# Patient Record
(demographics unavailable — no encounter records)

---

## 2024-12-18 NOTE — PHYSICAL EXAM
[Clear to Auscultation] : lungs were clear to auscultation bilaterally [Normal S1, S2] : normal S1 and S2 [Normal Appearance] : normal in appearance [No Masses] : no palpable masses [No Nipple Discharge] : no nipple discharge [No Axillary Lymphadenopathy] : no axillary lymphadenopathy [Soft] : abdomen soft [Normal] : affect was normal and insight and judgment were intact

## 2024-12-18 NOTE — HISTORY OF PRESENT ILLNESS
[FreeTextEntry1] : HERE FOR Comprehensive annual examination  [de-identified] : LUNG CANCER STAGE 4 F/U MSK SINCE 2022

## 2024-12-18 NOTE — HEALTH RISK ASSESSMENT
[Fair] :  ~his/her~ mood as fair [No] : No [No falls in past year] : Patient reported no falls in the past year [0] : 2) Feeling down, depressed, or hopeless: Not at all (0) [PHQ-2 Negative - No further assessment needed] : PHQ-2 Negative - No further assessment needed [Never] : Never [Patient reported mammogram was normal] : Patient reported mammogram was normal [Patient reported PAP Smear was normal] : Patient reported PAP Smear was normal [HIV test declined] : HIV test declined [Hepatitis C test declined] : Hepatitis C test declined [Fully functional (bathing, dressing, toileting, transferring, walking, feeding)] : Fully functional (bathing, dressing, toileting, transferring, walking, feeding) [Fully functional (using the telephone, shopping, preparing meals, housekeeping, doing laundry, using] : Fully functional and needs no help or supervision to perform IADLs (using the telephone, shopping, preparing meals, housekeeping, doing laundry, using transportation, managing medications and managing finances) [With Patient/Caregiver] : , with patient/caregiver [[id number]] : 0 [MammogramDate] : 2024 [PapSmearDate] : 2023 [AdvancecareDate] : 12/2024

## 2024-12-18 NOTE — ASSESSMENT
[FreeTextEntry1] : DISCUSSED BLOOD WORK RESULTS FROM 12/11/2024 HB 11.1 ON CHEMO STABLE HDL is low.  I recommend trying lifestyle changes and sticking to a low cholesterol diet and  heart-healthy fat found in olives,Nuts, including Brazil nuts, almonds, pistachios, WALNUTS, FISH salmon mackerel albacore tuna sardines rainbow trout, Avocados,Soy-based products, Tu seeds  returning in 3-4 months for repeat fasting blood work.    your vitamin D level was low.  This is a very common finding. I suggest taking a vitamin D3 supplement of 1000 I.U. daily. This is available over the counter.  MEDICATION RECONCILIATION DONE *Immunizations COVID -19 TOTAL VACCINE   4 Influenza  10/2024 Pneumococcal - N/A TDAP -  UTD shingles vaccine N/A EKG done for HCM at this office today;  NSR no acute ST-T changes Preventive medicine discussed - including importance of lifestyle modification - with incorporation of healthy diet,  recommended Diet low fat diet + regular exercise con't calcium 1200-1500mg and vit D 9640-0348 IU daily and regular weight bearing exercise for OP prevention weight loss counseling provided as above Depression screening WITH the Patient Health Questionnaire-2 (PHQ-2), THE RESULT WAS NEGATIVE. The benefits of adequate calcium intake and routine daily cardiovascular exercise were reviewed with the patient.  The patient was informed regarding the benefits of a YEARLY screening FOR SKIN CANCER -Recommended following up with dermatology for annual skin surveillance. -Recommended following up with GYN for annual surveillance. Recommended following up with dental, opthalmology  The importance of safer-sex was discussed with the patient. DISCUSSED PRECAUTIONS AGAINST COVID19, INCLUDING MASK WEARING, SOCIAL DISTANCING AND HAND WASHING. -Follow up in 1 year for CPE / annual exam or PRN. All questions and concerns were discussed.